# Patient Record
Sex: MALE | Race: OTHER | Employment: FULL TIME | URBAN - METROPOLITAN AREA
[De-identification: names, ages, dates, MRNs, and addresses within clinical notes are randomized per-mention and may not be internally consistent; named-entity substitution may affect disease eponyms.]

---

## 2017-01-03 ENCOUNTER — HOSPITAL ENCOUNTER (OUTPATIENT)
Dept: GENERAL RADIOLOGY | Age: 32
Discharge: HOME | End: 2017-01-03
Attending: EMERGENCY MEDICINE

## 2017-01-03 ENCOUNTER — OFFICE VISIT (OUTPATIENT)
Dept: OCCUPATIONAL MEDICINE | Age: 32
End: 2017-01-03
Attending: EMERGENCY MEDICINE

## 2017-01-03 DIAGNOSIS — S67.10XA CRUSHING INJURY OF FINGER: Primary | ICD-10-CM

## 2017-01-03 DIAGNOSIS — S67.10XA CRUSHING INJURY OF FINGER: ICD-10-CM

## 2017-01-03 PROCEDURE — 73140 X-RAY EXAM OF FINGER(S): CPT

## 2017-01-09 ENCOUNTER — APPOINTMENT (OUTPATIENT)
Dept: OCCUPATIONAL MEDICINE | Age: 32
End: 2017-01-09
Attending: EMERGENCY MEDICINE

## 2017-01-17 ENCOUNTER — APPOINTMENT (OUTPATIENT)
Dept: OCCUPATIONAL MEDICINE | Age: 32
End: 2017-01-17
Attending: EMERGENCY MEDICINE

## 2017-01-24 ENCOUNTER — APPOINTMENT (OUTPATIENT)
Dept: OCCUPATIONAL MEDICINE | Age: 32
End: 2017-01-24
Attending: EMERGENCY MEDICINE

## 2018-03-31 ENCOUNTER — HOSPITAL ENCOUNTER (OUTPATIENT)
Age: 33
Discharge: HOME | End: 2018-03-31
Payer: COMMERCIAL

## 2018-03-31 VITALS
OXYGEN SATURATION: 98 % | SYSTOLIC BLOOD PRESSURE: 134 MMHG | RESPIRATION RATE: 18 BRPM | HEIGHT: 67 IN | BODY MASS INDEX: 34.53 KG/M2 | DIASTOLIC BLOOD PRESSURE: 85 MMHG | TEMPERATURE: 98 F | WEIGHT: 220 LBS | HEART RATE: 66 BPM

## 2018-03-31 DIAGNOSIS — M54.32 SCIATICA OF LEFT SIDE: Primary | ICD-10-CM

## 2018-03-31 PROCEDURE — 99214 OFFICE O/P EST MOD 30 MIN: CPT

## 2018-03-31 PROCEDURE — 96372 THER/PROPH/DIAG INJ SC/IM: CPT

## 2018-03-31 PROCEDURE — 99204 OFFICE O/P NEW MOD 45 MIN: CPT

## 2018-03-31 RX ORDER — KETOROLAC TROMETHAMINE 30 MG/ML
60 INJECTION, SOLUTION INTRAMUSCULAR; INTRAVENOUS ONCE
Status: COMPLETED | OUTPATIENT
Start: 2018-03-31 | End: 2018-03-31

## 2018-03-31 RX ORDER — IBUPROFEN 600 MG/1
600 TABLET ORAL EVERY 8 HOURS PRN
Qty: 30 TABLET | Refills: 0 | Status: SHIPPED | OUTPATIENT
Start: 2018-03-31 | End: 2018-04-07

## 2018-03-31 RX ORDER — CYCLOBENZAPRINE HCL 10 MG
10 TABLET ORAL 3 TIMES DAILY PRN
Qty: 20 TABLET | Refills: 0 | Status: SHIPPED | OUTPATIENT
Start: 2018-03-31 | End: 2018-04-07

## 2018-03-31 NOTE — ED PROVIDER NOTES
Patient presents with:  Lower Extremity Injury (musculoskeletal)      HPI:     Charlotte Martinez is a 29year old male with no past medical history presents with left low back pain that radiates down leg. Patient reports pain started last night.   No injur worsening symptoms.   Patient verbalized plan of care and states understanding peer      Orders Placed This Encounter      Ketorolac Tromethamine (TORADOL) 60 MG/2ML injection 60 mg      Cyclobenzaprine HCl 10 MG Oral Tab          Sig: Take 1 tablet (10 mg

## 2018-03-31 NOTE — ED INITIAL ASSESSMENT (HPI)
c/o L leg pain that shots down leg. Started last night. Has been treated for sciatica before and says this feels the same. Denies injury. Pt states pain is worse when sitting. Pain is less when moving around.

## 2018-05-27 ENCOUNTER — HOSPITAL ENCOUNTER (OUTPATIENT)
Age: 33
Discharge: HOME | End: 2018-05-27
Attending: FAMILY MEDICINE
Payer: COMMERCIAL

## 2018-05-27 VITALS
TEMPERATURE: 98 F | DIASTOLIC BLOOD PRESSURE: 68 MMHG | WEIGHT: 215 LBS | RESPIRATION RATE: 18 BRPM | HEART RATE: 103 BPM | BODY MASS INDEX: 33.74 KG/M2 | OXYGEN SATURATION: 95 % | HEIGHT: 67 IN | SYSTOLIC BLOOD PRESSURE: 125 MMHG

## 2018-05-27 DIAGNOSIS — K64.4 EXTERNAL HEMORRHOIDS WITH COMPLICATION: Primary | ICD-10-CM

## 2018-05-27 PROCEDURE — 99214 OFFICE O/P EST MOD 30 MIN: CPT

## 2018-05-27 PROCEDURE — 99213 OFFICE O/P EST LOW 20 MIN: CPT

## 2018-05-27 RX ORDER — NAPROXEN 500 MG/1
500 TABLET ORAL 2 TIMES DAILY WITH MEALS
Qty: 14 TABLET | Refills: 0 | Status: SHIPPED | OUTPATIENT
Start: 2018-05-27 | End: 2018-06-03

## 2018-05-27 RX ORDER — DOCUSATE SODIUM 100 MG/1
100 CAPSULE, LIQUID FILLED ORAL 2 TIMES DAILY
Qty: 28 CAPSULE | Refills: 0 | Status: SHIPPED | OUTPATIENT
Start: 2018-05-27 | End: 2018-06-10

## 2018-05-27 NOTE — ED INITIAL ASSESSMENT (HPI)
Pt reports pain near rectal area. Hx of hemorrhoids. Denies blood in stool. Feels like he is straining to have a bowel movement.

## 2018-05-27 NOTE — ED PROVIDER NOTES
Patient Seen in: [facility] In [address] [place] [address]    History   Patient presents with:  Eval-G (gynecologic)    Stated Complaint: Hemorrhoids    HPI    Patient is here with pain at the rectum. For the past 2 days.   Worse with having a bowel m person, place, and time. Skin: He is not diaphoretic. Psychiatric: He has a normal mood and affect. His behavior is normal.   Nursing note and vitals reviewed.         ED Course   Labs Reviewed - No data to display    ED Course as of May 27 1421  ------

## 2018-05-30 ENCOUNTER — OFFICE VISIT (OUTPATIENT)
Dept: SURGERY | Facility: CLINIC | Age: 33
End: 2018-05-30

## 2018-05-30 VITALS
SYSTOLIC BLOOD PRESSURE: 120 MMHG | HEIGHT: 67 IN | BODY MASS INDEX: 33.74 KG/M2 | DIASTOLIC BLOOD PRESSURE: 78 MMHG | HEART RATE: 76 BPM | WEIGHT: 215 LBS

## 2018-05-30 DIAGNOSIS — K64.4 EXTERNAL HEMORRHOIDS WITHOUT COMPLICATION: Primary | ICD-10-CM

## 2018-05-30 PROCEDURE — 99204 OFFICE O/P NEW MOD 45 MIN: CPT | Performed by: SURGERY

## 2018-05-30 PROCEDURE — 99212 OFFICE O/P EST SF 10 MIN: CPT | Performed by: SURGERY

## 2018-05-30 NOTE — PROGRESS NOTES
HPI:    Patient ID: Caty Douglas is a 29year old male presenting with Patient presents with:  Hemorrhoids: First noticed 2 weeks ago. Protruding and painful. No bleeding. Bowel movements are difficult, constipation.   Went to ED, was given pain med Multiple Vitamins-Minerals (MULTI-VITAMIN/MINERALS) Oral Tab Take 1 tablet by mouth daily.  Disp:  Rfl:        Allergies:No Known Allergies   PHYSICAL EXAM:   /78 (BP Location: Left arm, Patient Position: Sitting, Cuff Size: adult)   Pulse 76   Ht 5'

## 2021-01-13 ENCOUNTER — OFFICE VISIT (OUTPATIENT)
Dept: FAMILY MEDICINE CLINIC | Facility: CLINIC | Age: 36
End: 2021-01-13

## 2021-01-13 VITALS
SYSTOLIC BLOOD PRESSURE: 146 MMHG | DIASTOLIC BLOOD PRESSURE: 89 MMHG | WEIGHT: 253 LBS | HEART RATE: 79 BPM | HEIGHT: 67 IN | BODY MASS INDEX: 39.71 KG/M2

## 2021-01-13 DIAGNOSIS — U07.1 COVID-19: Primary | ICD-10-CM

## 2021-01-13 PROCEDURE — 3079F DIAST BP 80-89 MM HG: CPT | Performed by: FAMILY MEDICINE

## 2021-01-13 PROCEDURE — 3008F BODY MASS INDEX DOCD: CPT | Performed by: FAMILY MEDICINE

## 2021-01-13 PROCEDURE — 3077F SYST BP >= 140 MM HG: CPT | Performed by: FAMILY MEDICINE

## 2021-01-13 PROCEDURE — 99213 OFFICE O/P EST LOW 20 MIN: CPT | Performed by: FAMILY MEDICINE

## 2021-01-13 NOTE — PROGRESS NOTES
1/13/2021  9:36 AM    Hiro Mike is a 37year old male.     Chief complaint(s): Patient presents with:  Covid: positive covid follow up  Note For Work: clearing him to go back to work     HPI:     Hiro Mike primary complaint is regarding COVI Negative for headaches. Psychiatric/Behavioral: Negative for depressed mood.        PHYSICAL EXAM:   VS: /89   Pulse 79   Ht 5' 7\" (1.702 m)   Wt 253 lb (114.8 kg)   BMI 39.63 kg/m²     Physical Exam    Constitutional: He appears well-developed and

## (undated) NOTE — LETTER
1/13/2021          To Whom It May Concern:    Mora Teran is currently under my medical care and may return to work at this time. He may return to work on 01/15/21. Activity is restricted as follows: none.     If you require additional information